# Patient Record
Sex: FEMALE | Race: WHITE | NOT HISPANIC OR LATINO | URBAN - METROPOLITAN AREA
[De-identification: names, ages, dates, MRNs, and addresses within clinical notes are randomized per-mention and may not be internally consistent; named-entity substitution may affect disease eponyms.]

---

## 2017-01-19 ENCOUNTER — OFFICE VISIT (OUTPATIENT)
Dept: RETAIL CLINIC | Facility: CLINIC | Age: 57
End: 2017-01-19

## 2017-01-19 VITALS
SYSTOLIC BLOOD PRESSURE: 120 MMHG | HEART RATE: 90 BPM | RESPIRATION RATE: 18 BRPM | OXYGEN SATURATION: 96 % | DIASTOLIC BLOOD PRESSURE: 88 MMHG | TEMPERATURE: 97.9 F

## 2017-01-19 DIAGNOSIS — J03.90 TONSILLITIS: Primary | ICD-10-CM

## 2017-01-19 DIAGNOSIS — H65.21 RIGHT CHRONIC SEROUS OTITIS MEDIA: ICD-10-CM

## 2017-01-19 PROCEDURE — 99213 OFFICE O/P EST LOW 20 MIN: CPT | Performed by: NURSE PRACTITIONER

## 2017-01-19 RX ORDER — LORATADINE 10 MG/1
10 TABLET ORAL DAILY
Qty: 30 TABLET | Refills: 0 | Status: SHIPPED | OUTPATIENT
Start: 2017-01-19 | End: 2017-02-18

## 2017-01-19 RX ORDER — FLUTICASONE PROPIONATE 50 MCG
1 SPRAY, SUSPENSION (ML) NASAL 2 TIMES DAILY
Qty: 1 EACH | Refills: 0 | Status: SHIPPED | OUTPATIENT
Start: 2017-01-19 | End: 2017-02-18

## 2017-01-19 RX ORDER — PREDNISONE 10 MG/1
10 TABLET ORAL 2 TIMES DAILY
Qty: 14 TABLET | Refills: 0 | Status: SHIPPED | OUTPATIENT
Start: 2017-01-19 | End: 2017-01-26

## 2017-01-19 NOTE — PROGRESS NOTES
Subjective   Paige Castro is a 56 y.o. female.     HPI Comments: Patient reports she has felt ear pressure and popping since mid-November. She was treated for serous otitis 11/29 with prednisone and reports her symptoms improved but never completely resolved. She has not seen ENT or allergist for these complaints.    URI    This is a new problem. The current episode started yesterday. The problem has been unchanged. There has been no fever. Associated symptoms include congestion, a plugged ear sensation and a sore throat. Pertinent negatives include no abdominal pain, chest pain, coughing, diarrhea, ear pain, headaches, nausea, neck pain, rash, rhinorrhea, sinus pain, sneezing, swollen glands, vomiting or wheezing. She has tried nothing for the symptoms.       The following portions of the patient's history were reviewed and updated as appropriate: allergies, current medications, past family history, past medical history, past social history, past surgical history and problem list.    Review of Systems   Constitutional: Positive for fatigue. Negative for appetite change, chills, diaphoresis and fever.   HENT: Positive for congestion, postnasal drip and sore throat. Negative for dental problem, ear discharge, ear pain, facial swelling, hearing loss, mouth sores, nosebleeds, rhinorrhea, sinus pressure, sneezing, tinnitus, trouble swallowing and voice change.         Positive for ear pressure     Eyes: Negative for pain, discharge, redness and itching.   Respiratory: Negative for cough, chest tightness, shortness of breath, wheezing and stridor.    Cardiovascular: Negative for chest pain and palpitations.   Gastrointestinal: Negative for abdominal pain, constipation, diarrhea, nausea and vomiting.   Genitourinary: Negative for decreased urine volume.   Musculoskeletal: Negative for myalgias, neck pain and neck stiffness.   Skin: Negative for rash.   Allergic/Immunologic: Positive for environmental allergies.    Neurological: Negative for dizziness, syncope, weakness and headaches.       Objective   Physical Exam   Constitutional: She is oriented to person, place, and time. She appears well-developed and well-nourished. She is cooperative.  Non-toxic appearance. She does not appear ill. No distress.   HENT:   Right Ear: Hearing, external ear and ear canal normal. Tympanic membrane is not scarred, not perforated, not erythematous, not retracted and not bulging. A middle ear effusion is present.   Left Ear: Hearing, external ear and ear canal normal. Tympanic membrane is not scarred, not perforated, not erythematous, not retracted and not bulging. A middle ear effusion is present.   Nose: Nose normal. Right sinus exhibits no maxillary sinus tenderness and no frontal sinus tenderness. Left sinus exhibits no maxillary sinus tenderness and no frontal sinus tenderness.   Mouth/Throat: Uvula is midline and mucous membranes are normal. Posterior oropharyngeal erythema (mild) present. No oropharyngeal exudate or posterior oropharyngeal edema. Tonsils are 3+ on the right. Tonsils are 3+ on the left. No tonsillar exudate.   Eyes: Conjunctivae and lids are normal.   Cardiovascular: Normal rate, regular rhythm, S1 normal and S2 normal.    Pulmonary/Chest: Effort normal and breath sounds normal.   Abdominal: Soft. Normal appearance and bowel sounds are normal. There is no tenderness.   Lymphadenopathy:     She has no cervical adenopathy.   Neurological: She is alert and oriented to person, place, and time.   Skin: Skin is warm and dry. She is not diaphoretic. No pallor.   Vitals reviewed.      Assessment/Plan   Paige was seen today for fatigue, generalized body aches, diarrhea, ear fullness, swollen glands, headache and chills.    Diagnoses and all orders for this visit:    Tonsillitis    Right chronic serous otitis media    Other orders  -     predniSONE (DELTASONE) 10 MG tablet; Take 1 tablet by mouth 2 (Two) Times a Day for 7  days.  -     fluticasone (FLONASE) 50 MCG/ACT nasal spray; 1 spray into each nostril 2 (Two) Times a Day for 30 days.  -     loratadine (CLARITIN) 10 MG tablet; Take 1 tablet by mouth Daily for 30 days.

## 2017-01-19 NOTE — LETTER
January 19, 2017     Patient: Paige Castro   YOB: 1960   Date of Visit: 1/19/2017       To Whom It May Concern:    Paige Castro was seen at my clinic today. Please excuse her from work today due to her acute illness.           Sincerely,      NORA Hernandes  Provider Peri Gustafson

## 2017-01-19 NOTE — PATIENT INSTRUCTIONS
Tonsillitis  Tonsillitis is an infection of the throat that causes the tonsils to become red, tender, and swollen. Tonsils are collections of lymphoid tissue at the back of the throat. Each tonsil has crevices (crypts). Tonsils help fight nose and throat infections and keep infection from spreading to other parts of the body for the first 18 months of life.   CAUSES  Sudden (acute) tonsillitis is usually caused by infection with streptococcal bacteria. Long-lasting (chronic) tonsillitis occurs when the crypts of the tonsils become filled with pieces of food and bacteria, which makes it easy for the tonsils to become repeatedly infected.  SYMPTOMS   Symptoms of tonsillitis include:  · A sore throat, with possible difficulty swallowing.  · White patches on the tonsils.  · Fever.  · Tiredness.  · New episodes of snoring during sleep, when you did not snore before.  · Small, foul-smelling, yellowish-white pieces of material (tonsilloliths) that you occasionally cough up or spit out. The tonsilloliths can also cause you to have bad breath.  DIAGNOSIS  Tonsillitis can be diagnosed through a physical exam. Diagnosis can be confirmed with the results of lab tests, including a throat culture.  TREATMENT   The goals of tonsillitis treatment include the reduction of the severity and duration of symptoms and prevention of associated conditions. Symptoms of tonsillitis can be improved with the use of steroids to reduce the swelling. Tonsillitis caused by bacteria can be treated with antibiotic medicines. Usually, treatment with antibiotic medicines is started before the cause of the tonsillitis is known. However, if it is determined that the cause is not bacterial, antibiotic medicines will not treat the tonsillitis. If attacks of tonsillitis are severe and frequent, your health care provider may recommend surgery to remove the tonsils (tonsillectomy).  HOME CARE INSTRUCTIONS   · Rest as much as possible and get plenty of  sleep.  · Drink plenty of fluids. While the throat is very sore, eat soft foods or liquids, such as sherbet, soups, or instant breakfast drinks.  · Eat frozen ice pops.  · Gargle with a warm or cold liquid to help soothe the throat. Mix 1/4 teaspoon of salt and 1/4 teaspoon of baking soda in 8 oz of water.  SEEK MEDICAL CARE IF:   · Large, tender lumps develop in your neck.  · A rash develops.  · A green, yellow-brown, or bloody substance is coughed up.  · You are unable to swallow liquids or food for 24 hours.  · You notice that only one of the tonsils is swollen.  SEEK IMMEDIATE MEDICAL CARE IF:   · You develop any new symptoms such as vomiting, severe headache, stiff neck, chest pain, or trouble breathing or swallowing.  · You have severe throat pain along with drooling or voice changes.  · You have severe pain, unrelieved with recommended medications.  · You are unable to fully open the mouth.  · You develop redness, swelling, or severe pain anywhere in the neck.  · You have a fever.  MAKE SURE YOU:   · Understand these instructions.  · Will watch your condition.  · Will get help right away if you are not doing well or get worse.     This information is not intended to replace advice given to you by your health care provider. Make sure you discuss any questions you have with your health care provider.     Document Released: 09/27/2006 Document Revised: 01/08/2016 Document Reviewed: 06/06/2014  [employer] Interactive Patient Education ©2016 [employer]      Serous Otitis Media  Serous otitis media is fluid in the middle ear space. This space contains the bones for hearing and air. Air in the middle ear space helps to transmit sound.   The air gets there through the eustachian tube. This tube goes from the back of the nose (nasopharynx) to the middle ear space. It keeps the pressure in the middle ear the same as the outside world. It also helps to drain fluid from the middle ear space.  CAUSES   Serous otitis  media occurs when the eustachian tube gets blocked. Blockage can come from:  · Ear infections.  · Colds and other upper respiratory infections.  · Allergies.  · Irritants such as cigarette smoke.  · Sudden changes in air pressure (such as descending in an airplane).  · Enlarged adenoids.  · A mass in the nasopharynx.  During colds and upper respiratory infections, the middle ear space can become temporarily filled with fluid. This can happen after an ear infection also. Once the infection clears, the fluid will generally drain out of the ear through the eustachian tube. If it does not, then serous otitis media occurs.  SIGNS AND SYMPTOMS   · Hearing loss.  · A feeling of fullness in the ear, without pain.  · Young children may not show any symptoms but may show slight behavioral changes, such as agitation, ear pulling, or crying.  DIAGNOSIS   Serous otitis media is diagnosed by an ear exam. Tests may be done to check on the movement of the eardrum. Hearing exams may also be done.  TREATMENT   The fluid most often goes away without treatment. If allergy is the cause, allergy treatment may be helpful. Fluid that persists for several months may require minor surgery. A small tube is placed in the eardrum to:  · Drain the fluid.  · Restore the air in the middle ear space.  In certain situations, antibiotic medicines are used to avoid surgery. Surgery may be done to remove enlarged adenoids (if this is the cause).  HOME CARE INSTRUCTIONS   · Keep children away from tobacco smoke.  · Keep all follow-up visits as directed by your health care provider.  SEEK MEDICAL CARE IF:   · Your hearing is not better in 3 months.  · Your hearing is worse.  · You have ear pain.  · You have drainage from the ear.  · You have dizziness.  · You have serous otitis media only in one ear or have any bleeding from your nose (epistaxis).  · You notice a lump on your neck.  MAKE SURE YOU:  · Understand these instructions.    · Will watch your  condition.    · Will get help right away if you are not doing well or get worse.       This information is not intended to replace advice given to you by your health care provider. Make sure you discuss any questions you have with your health care provider.     Document Released: 03/09/2005 Document Revised: 01/08/2016 Document Reviewed: 07/15/2014  [employer] Interactive Interactive Patient Education ©2016 [employer].

## 2017-01-19 NOTE — MR AVS SNAPSHOT
Paige Castro   1/19/2017 2:00 PM   Office Visit    Dept Phone:  [phone]   Encounter #:  [id number]    Provider:  Romie Gustafson   Department:  [facility]                Your Full Care Plan              Today's Medication Changes          These changes are accurate as of: 1/19/17  2:28 PM.  If you have any questions, ask your nurse or doctor.               New Medication(s)Ordered:     fluticasone 50 MCG/ACT nasal spray   Commonly known as:  FLONASE   1 spray into each nostril 2 (Two) Times a Day for 30 days.   Started by:  Romie Gustafson       loratadine 10 MG tablet   Commonly known as:  CLARITIN   Take 1 tablet by mouth Daily for 30 days.   Started by:  Provider Peri Gustafson         Medication(s)that have changed:     predniSONE 10 MG tablet   Commonly known as:  DELTASONE   Take 1 tablet by mouth 2 (Two) Times a Day for 7 days.   What changed:    - how much to take  - how to take this  - when to take this  - additional instructions   Changed by:  Provider Peri Gustafson            Where to Get Your Medications      These medications were sent to [facility] [address] [place] [address], [place] - [address] [address] - [phone] [facility] [phone]   [address] [address]     Phone:  [phone]     fluticasone 50 MCG/ACT nasal spray    loratadine 10 MG tablet    predniSONE 10 MG tablet                  Your Updated Medication List          This list is accurate as of: 1/19/17  2:28 PM.  Always use your most recent med list.                fluticasone 50 MCG/ACT nasal spray   Commonly known as:  FLONASE   1 spray into each nostril 2 (Two) Times a Day for 30 days.       loratadine 10 MG tablet   Commonly known as:  CLARITIN   Take 1 tablet by mouth Daily for 30 days.       losartan 50 MG tablet   Commonly known as:  COZAAR       predniSONE 10 MG tablet   Commonly known as:  DELTASONE   Take 1 tablet by mouth 2 (Two) Times a Day for 7 days.       TYLENOL PO               You Were Diagnosed With        Codes Comments    Tonsillitis    -  Primary ICD-10-CM: J03.90  ICD-9-CM: 463     Right chronic serous otitis media     ICD-10-CM: H65.21  ICD-9-CM: 381.10       Instructions    Tonsillitis  Tonsillitis is an infection of the throat that causes the tonsils to become red, tender, and swollen. Tonsils are collections of lymphoid tissue at the back of the throat. Each tonsil has crevices (crypts). Tonsils help fight nose and throat infections and keep infection from spreading to other parts of the body for the first 18 months of life.   CAUSES  Sudden (acute) tonsillitis is usually caused by infection with streptococcal bacteria. Long-lasting (chronic) tonsillitis occurs when the crypts of the tonsils become filled with pieces of food and bacteria, which makes it easy for the tonsils to become repeatedly infected.  SYMPTOMS   Symptoms of tonsillitis include:  · A sore throat, with possible difficulty swallowing.  · White patches on the tonsils.  · Fever.  · Tiredness.  · New episodes of snoring during sleep, when you did not snore before.  · Small, foul-smelling, yellowish-white pieces of material (tonsilloliths) that you occasionally cough up or spit out. The tonsilloliths can also cause you to have bad breath.  DIAGNOSIS  Tonsillitis can be diagnosed through a physical exam. Diagnosis can be confirmed with the results of lab tests, including a throat culture.  TREATMENT   The goals of tonsillitis treatment include the reduction of the severity and duration of symptoms and prevention of associated conditions. Symptoms of tonsillitis can be improved with the use of steroids to reduce the swelling. Tonsillitis caused by bacteria can be treated with antibiotic medicines. Usually, treatment with antibiotic medicines is started before the cause of the tonsillitis is known. However, if it is determined that the cause is not bacterial, antibiotic medicines will not treat the tonsillitis. If  attacks of tonsillitis are severe and frequent, your health care provider may recommend surgery to remove the tonsils (tonsillectomy).  HOME CARE INSTRUCTIONS   · Rest as much as possible and get plenty of sleep.  · Drink plenty of fluids. While the throat is very sore, eat soft foods or liquids, such as sherbet, soups, or instant breakfast drinks.  · Eat frozen ice pops.  · Gargle with a warm or cold liquid to help soothe the throat. Mix 1/4 teaspoon of salt and 1/4 teaspoon of baking soda in 8 oz of water.  SEEK MEDICAL CARE IF:   · Large, tender lumps develop in your neck.  · A rash develops.  · A green, yellow-brown, or bloody substance is coughed up.  · You are unable to swallow liquids or food for 24 hours.  · You notice that only one of the tonsils is swollen.  SEEK IMMEDIATE MEDICAL CARE IF:   · You develop any new symptoms such as vomiting, severe headache, stiff neck, chest pain, or trouble breathing or swallowing.  · You have severe throat pain along with drooling or voice changes.  · You have severe pain, unrelieved with recommended medications.  · You are unable to fully open the mouth.  · You develop redness, swelling, or severe pain anywhere in the neck.  · You have a fever.  MAKE SURE YOU:   · Understand these instructions.  · Will watch your condition.  · Will get help right away if you are not doing well or get worse.     This information is not intended to replace advice given to you by your health care provider. Make sure you discuss any questions you have with your health care provider.     Document Released: 09/27/2006 Document Revised: 01/08/2016 Document Reviewed: 06/06/2014  [employer] Interactive Patient Education ©2016 [employer]      Serous Otitis Media  Serous otitis media is fluid in the middle ear space. This space contains the bones for hearing and air. Air in the middle ear space helps to transmit sound.   The air gets there through the eustachian tube. This tube goes from the back  of the nose (nasopharynx) to the middle ear space. It keeps the pressure in the middle ear the same as the outside world. It also helps to drain fluid from the middle ear space.  CAUSES   Serous otitis media occurs when the eustachian tube gets blocked. Blockage can come from:  · Ear infections.  · Colds and other upper respiratory infections.  · Allergies.  · Irritants such as cigarette smoke.  · Sudden changes in air pressure (such as descending in an airplane).  · Enlarged adenoids.  · A mass in the nasopharynx.  During colds and upper respiratory infections, the middle ear space can become temporarily filled with fluid. This can happen after an ear infection also. Once the infection clears, the fluid will generally drain out of the ear through the eustachian tube. If it does not, then serous otitis media occurs.  SIGNS AND SYMPTOMS   · Hearing loss.  · A feeling of fullness in the ear, without pain.  · Young children may not show any symptoms but may show slight behavioral changes, such as agitation, ear pulling, or crying.  DIAGNOSIS   Serous otitis media is diagnosed by an ear exam. Tests may be done to check on the movement of the eardrum. Hearing exams may also be done.  TREATMENT   The fluid most often goes away without treatment. If allergy is the cause, allergy treatment may be helpful. Fluid that persists for several months may require minor surgery. A small tube is placed in the eardrum to:  · Drain the fluid.  · Restore the air in the middle ear space.  In certain situations, antibiotic medicines are used to avoid surgery. Surgery may be done to remove enlarged adenoids (if this is the cause).  HOME CARE INSTRUCTIONS   · Keep children away from tobacco smoke.  · Keep all follow-up visits as directed by your health care provider.  SEEK MEDICAL CARE IF:   · Your hearing is not better in 3 months.  · Your hearing is worse.  · You have ear pain.  · You have drainage from the ear.  · You have  dizziness.  · You have serous otitis media only in one ear or have any bleeding from your nose (epistaxis).  · You notice a lump on your neck.  MAKE SURE YOU:  · Understand these instructions.    · Will watch your condition.    · Will get help right away if you are not doing well or get worse.       This information is not intended to replace advice given to you by your health care provider. Make sure you discuss any questions you have with your health care provider.     Document Released: 2005 Document Revised: 2016 Document Reviewed: 07/15/2014  [employer] Interactive Patient Education © Elsevier Inc.       Patient Instructions History      Upcoming Appointments     Visit Type Date Time Department    OFFICE VISIT 2017  2:00 PM [facility]      [employer] Signup     [facility] allows you to send messages to your doctor, view your test results, renew your prescriptions, schedule appointments, and more. To sign up, go to [employer] and click on the Sign Up Now link in the New User? box. Enter your [employer] Activation Code exactly as it appears below along with the last four digits of your Social Security Number and your Date of Birth () to complete the sign-up process. If you do not sign up before the expiration date, you must request a new code.    Mozilla Activation Code: [email or web address]  Expires: 2017  2:28 PM    If you have questions, you can email [email or web address] or call [phone] to talk to our [employer] staff. Remember, [employer] is NOT to be used for urgent needs. For medical emergencies, dial 911.               Other Info from Your Visit           Allergies     No Known Allergies      Reason for Visit     Fatigue     Generalized Body Aches     Diarrhea pt reports diarrhea this AM.     Ear Fullness pt reports fluid in rt ear.     Swollen Glands     Nausea     Headache     Chills           Vital Signs     Blood Pressure Pulse Temperature     120/88 (BP Location: Left arm, Patient Position: Sitting, Cuff Size: Large Adult) 90 97.9 °F (36.6 °C) (Oral)    Respirations Oxygen Saturation Smoking Status    18 96% Passive Smoke Exposure - Never Smoker      Problems and Diagnoses Noted     Tonsillitis    -  Primary    Middle ear infection

## 2017-02-05 ENCOUNTER — OFFICE VISIT (OUTPATIENT)
Dept: RETAIL CLINIC | Facility: CLINIC | Age: 57
End: 2017-02-05

## 2017-02-05 VITALS
HEART RATE: 73 BPM | TEMPERATURE: 97.7 F | DIASTOLIC BLOOD PRESSURE: 90 MMHG | RESPIRATION RATE: 20 BRPM | OXYGEN SATURATION: 98 % | SYSTOLIC BLOOD PRESSURE: 144 MMHG

## 2017-02-05 DIAGNOSIS — J01.00 ACUTE MAXILLARY SINUSITIS, RECURRENCE NOT SPECIFIED: Primary | ICD-10-CM

## 2017-02-05 LAB
EXPIRATION DATE: NORMAL
INTERNAL CONTROL: NORMAL
Lab: NORMAL
S PYO AG THROAT QL: NEGATIVE

## 2017-02-05 PROCEDURE — 99213 OFFICE O/P EST LOW 20 MIN: CPT | Performed by: NURSE PRACTITIONER

## 2017-02-05 PROCEDURE — 87880 STREP A ASSAY W/OPTIC: CPT | Performed by: NURSE PRACTITIONER

## 2017-02-05 RX ORDER — AMOXICILLIN AND CLAVULANATE POTASSIUM 875; 125 MG/1; MG/1
1 TABLET, FILM COATED ORAL 2 TIMES DAILY
Qty: 20 TABLET | Refills: 0 | Status: SHIPPED | OUTPATIENT
Start: 2017-02-05 | End: 2017-02-15

## 2017-02-05 NOTE — PATIENT INSTRUCTIONS
Call ENT tomorrow to check on your throat culture results.      Sinusitis, Adult  Sinusitis is redness, soreness, and inflammation of the paranasal sinuses. Paranasal sinuses are air pockets within the bones of your face. They are located beneath your eyes, in the middle of your forehead, and above your eyes. In healthy paranasal sinuses, mucus is able to drain out, and air is able to circulate through them by way of your nose. However, when your paranasal sinuses are inflamed, mucus and air can become trapped. This can allow bacteria and other germs to grow and cause infection.  Sinusitis can develop quickly and last only a short time (acute) or continue over a long period (chronic). Sinusitis that lasts for more than 12 weeks is considered chronic.  CAUSES  Causes of sinusitis include:  · Allergies.  · Structural abnormalities, such as displacement of the cartilage that separates your nostrils (deviated septum), which can decrease the air flow through your nose and sinuses and affect sinus drainage.  · Functional abnormalities, such as when the small hairs (cilia) that line your sinuses and help remove mucus do not work properly or are not present.  SIGNS AND SYMPTOMS  Symptoms of acute and chronic sinusitis are the same. The primary symptoms are pain and pressure around the affected sinuses. Other symptoms include:  · Upper toothache.  · Earache.  · Headache.  · Bad breath.  · Decreased sense of smell and taste.  · A cough, which worsens when you are lying flat.  · Fatigue.  · Fever.  · Thick drainage from your nose, which often is green and may contain pus (purulent).  · Swelling and warmth over the affected sinuses.  DIAGNOSIS  Your health care provider will perform a physical exam. During your exam, your health care provider may perform any of the following to help determine if you have acute sinusitis or chronic sinusitis:  · Look in your nose for signs of abnormal growths in your nostrils (nasal  polyps).  · Tap over the affected sinus to check for signs of infection.  · View the inside of your sinuses using an imaging device that has a light attached (endoscope).  If your health care provider suspects that you have chronic sinusitis, one or more of the following tests may be recommended:  · Allergy tests.  · Nasal culture. A sample of mucus is taken from your nose, sent to a lab, and screened for bacteria.  · Nasal cytology. A sample of mucus is taken from your nose and examined by your health care provider to determine if your sinusitis is related to an allergy.  TREATMENT  Most cases of acute sinusitis are related to a viral infection and will resolve on their own within 10 days. Sometimes, medicines are prescribed to help relieve symptoms of both acute and chronic sinusitis. These may include pain medicines, decongestants, nasal steroid sprays, or saline sprays.  However, for sinusitis related to a bacterial infection, your health care provider will prescribe antibiotic medicines. These are medicines that will help kill the bacteria causing the infection.  Rarely, sinusitis is caused by a fungal infection. In these cases, your health care provider will prescribe antifungal medicine.  For some cases of chronic sinusitis, surgery is needed. Generally, these are cases in which sinusitis recurs more than 3 times per year, despite other treatments.  HOME CARE INSTRUCTIONS  · Drink plenty of water. Water helps thin the mucus so your sinuses can drain more easily.  · Use a humidifier.  · Inhale steam 3-4 times a day (for example, sit in the bathroom with the shower running).  · Apply a warm, moist washcloth to your face 3-4 times a day, or as directed by your health care provider.  · Use saline nasal sprays to help moisten and clean your sinuses.  · Take medicines only as directed by your health care provider.  · If you were prescribed either an antibiotic or antifungal medicine, finish it all even if you start  to feel better.  SEEK IMMEDIATE MEDICAL CARE IF:  · You have increasing pain or severe headaches.  · You have nausea, vomiting, or drowsiness.  · You have swelling around your face.  · You have vision problems.  · You have a stiff neck.  · You have difficulty breathing.     This information is not intended to replace advice given to you by your health care provider. Make sure you discuss any questions you have with your health care provider.     Document Released: 12/18/2006 Document Revised: 01/08/2016 Document Reviewed: 01/01/2013  [employer] Interactive Patient Education ©2016 [employer]

## 2017-02-05 NOTE — PROGRESS NOTES
Subjective   Paige Castro is a 56 y.o. female.     HPI Comments: Patient was seen on 1/19/17 and diagnosed with tonsillitis. She took prednisone and started flonase and claritin at that time. Her symptoms improved slightly, but she had an appointment with ENT 1.5 weeks ago and was told that she needed to have a tonsillectomy in order for her symptoms to improve. A culture was done to determine if she was a strep carrier and she has not received those results back from the office. 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 She reports the tumor spontaneously resolved without treatment. She is complaining of severe congestion as well that worsened 2 days ago.    Sinusitis   This is a new problem. Episode onset: 2 days ago. The problem has been gradually worsening since onset. There has been no fever. Associated symptoms include congestion, coughing, ear pain (right sided only) and sinus pressure. Pertinent negatives include no chills, diaphoresis, headaches, neck pain, shortness of breath, sneezing or sore throat. Past treatments include nothing.       The following portions of the patient's history were reviewed and updated as appropriate: allergies, current medications, past family history, past medical history, past social history, past surgical history and problem list.    Review of Systems   Constitutional: Positive for fatigue. Negative for appetite change, chills, diaphoresis and fever.   HENT: Positive for congestion, dental problem (upper and lower teeth pain), ear pain (right sided only), postnasal drip, rhinorrhea and sinus pressure. Negative for drooling, ear discharge, facial swelling, hearing loss, mouth sores, nosebleeds, sneezing, sore throat, tinnitus, trouble swallowing and voice change.    Eyes: Negative for pain, discharge, redness and itching.   Respiratory: Positive for cough. Negative for chest  tightness, shortness of breath, wheezing and stridor.    Cardiovascular: Negative for chest pain and palpitations.   Gastrointestinal: Negative for abdominal pain, constipation, diarrhea, nausea and vomiting.   Genitourinary: Negative for decreased urine volume.   Musculoskeletal: Negative for myalgias, neck pain and neck stiffness.   Skin: Negative for rash.   Allergic/Immunologic: Positive for environmental allergies.   Neurological: Negative for dizziness, syncope, weakness and headaches.       Objective   Physical Exam   Constitutional: She is oriented to person, place, and time. She appears well-developed and well-nourished. She is cooperative.  Non-toxic appearance. She appears ill. No distress.   HENT:   Right Ear: Hearing, tympanic membrane, external ear and ear canal normal.   Left Ear: Hearing, tympanic membrane, external ear and ear canal normal.   Nose: Mucosal edema, rhinorrhea and sinus tenderness present. Right sinus exhibits maxillary sinus tenderness. Right sinus exhibits no frontal sinus tenderness. Left sinus exhibits maxillary sinus tenderness. Left sinus exhibits no frontal sinus tenderness.   Mouth/Throat: Uvula is midline and mucous membranes are normal. Posterior oropharyngeal edema and posterior oropharyngeal erythema (mild) present. No oropharyngeal exudate. Tonsils are 3+ on the right. Tonsils are 3+ on the left. No tonsillar exudate.   Eyes: Conjunctivae and lids are normal.   Cardiovascular: Normal rate, regular rhythm, S1 normal and S2 normal.    Pulmonary/Chest: Effort normal and breath sounds normal.   Abdominal: Soft. Normal appearance and bowel sounds are normal. There is no tenderness.   Lymphadenopathy:     She has no cervical adenopathy.   Neurological: She is alert and oriented to person, place, and time.   Skin: Skin is warm and dry. She is not diaphoretic. No pallor.   Vitals reviewed.      Assessment/Plan   Paige was seen today for sinusitis.    Diagnoses and all orders for  this visit:    Acute maxillary sinusitis, recurrence not specified  -     POC Rapid Strep A    Other orders  -     amoxicillin-clavulanate (AUGMENTIN) 875-125 MG per tablet; Take 1 tablet by mouth 2 (Two) Times a Day for 10 days.  -     Chlorpheniramine-DM 4-30 MG tablet; Take 1 tablet by mouth 4 (Four) Times a Day As Needed (cough/congestion) for up to 5 days.          -      Call ENT for culture results tomorrow        -      Follow up with ENT for persistent or worsening symptoms    Lab Results (most recent)     Procedure Component Value Units Date/Time    POC Rapid Strep A [[id number]] Collected:  02/05/17 1157    Specimen:  Other Updated:  02/05/17 1158     Rapid Strep A Screen Negative      Internal Control Passed      Lot Number [id number]      Expiration Date 6/2018

## 2017-09-13 ENCOUNTER — OFFICE VISIT (OUTPATIENT)
Dept: RETAIL CLINIC | Facility: CLINIC | Age: 57
End: 2017-09-13

## 2017-09-13 VITALS
TEMPERATURE: 97 F | HEART RATE: 78 BPM | DIASTOLIC BLOOD PRESSURE: 72 MMHG | SYSTOLIC BLOOD PRESSURE: 128 MMHG | OXYGEN SATURATION: 98 % | RESPIRATION RATE: 17 BRPM

## 2017-09-13 DIAGNOSIS — H66.004 RECURRENT ACUTE SUPPURATIVE OTITIS MEDIA OF RIGHT EAR WITHOUT SPONTANEOUS RUPTURE OF TYMPANIC MEMBRANE: Primary | ICD-10-CM

## 2017-09-13 DIAGNOSIS — J30.9 ALLERGIC RHINITIS, UNSPECIFIED ALLERGIC RHINITIS TRIGGER, UNSPECIFIED RHINITIS SEASONALITY: ICD-10-CM

## 2017-09-13 PROCEDURE — 99213 OFFICE O/P EST LOW 20 MIN: CPT | Performed by: NURSE PRACTITIONER

## 2017-09-13 RX ORDER — AMOXICILLIN 875 MG/1
875 TABLET, COATED ORAL 2 TIMES DAILY
Qty: 20 TABLET | Refills: 0 | Status: SHIPPED | OUTPATIENT
Start: 2017-09-13 | End: 2017-09-23

## 2017-09-13 NOTE — PROGRESS NOTES
Milka Castro is a 57 y.o. female.     Earache    There is pain in both (right>left) ears. This is a new problem. The current episode started in the past 7 days. The problem occurs constantly. There has been no fever. Associated symptoms include headaches, rhinorrhea and a sore throat. Pertinent negatives include no abdominal pain, coughing or ear discharge. She has tried nothing for the symptoms. Her past medical history is significant for a chronic ear infection. There is no history of hearing loss.        The following portions of the patient's history were reviewed and updated as appropriate: allergies, current medications, past family history, past medical history, past social history, past surgical history and problem list.    Review of Systems   Constitutional: Negative for chills and fever.   HENT: Positive for ear pain, rhinorrhea and sore throat. Negative for ear discharge.    Respiratory: Negative for cough, shortness of breath and wheezing.    Cardiovascular: Negative for chest pain.   Gastrointestinal: Negative for abdominal pain.   Neurological: Positive for dizziness and headaches.       Objective   Physical Exam   Constitutional: She is oriented to person, place, and time. She appears well-developed and well-nourished. No distress.   HENT:   Head: Normocephalic and atraumatic.   Right Ear: Hearing, external ear and ear canal normal. Tympanic membrane is erythematous and bulging. A middle ear effusion is present.   Left Ear: Hearing, external ear and ear canal normal. A middle ear effusion is present.   Nose: Nose normal.   Mouth/Throat: Mucous membranes are normal. Posterior oropharyngeal erythema and tonsillar abscesses present. No oropharyngeal exudate. Tonsils are 2+ on the right. Tonsils are 2+ on the left. No tonsillar exudate.   Eyes: Conjunctivae and EOM are normal. Pupils are equal, round, and reactive to light.   Neck: Normal range of motion. Neck supple.   Cardiovascular: Normal  rate, regular rhythm and normal heart sounds.    Pulmonary/Chest: Effort normal and breath sounds normal.   Neurological: She is alert and oriented to person, place, and time.   Skin: Skin is warm and dry.   Psychiatric: She has a normal mood and affect. Her behavior is normal.       Assessment/Plan   Diagnoses and all orders for this visit:    Recurrent acute suppurative otitis media of right ear without spontaneous rupture of tympanic membrane    Allergic rhinitis, unspecified allergic rhinitis trigger, unspecified rhinitis seasonality    Other orders  -     amoxicillin (AMOXIL) 875 MG tablet; Take 1 tablet by mouth 2 (Two) Times a Day for 10 days.    Take an OTC antihistamine such as Claritin or Zyrtec as per bottle instructions.  Use OTC FLonase as directed.

## 2017-09-13 NOTE — PATIENT INSTRUCTIONS

## 2018-01-04 ENCOUNTER — OFFICE VISIT (OUTPATIENT)
Dept: RETAIL CLINIC | Facility: CLINIC | Age: 58
End: 2018-01-04

## 2018-01-04 VITALS
RESPIRATION RATE: 14 BRPM | SYSTOLIC BLOOD PRESSURE: 110 MMHG | DIASTOLIC BLOOD PRESSURE: 80 MMHG | OXYGEN SATURATION: 98 % | TEMPERATURE: 99.4 F | HEART RATE: 83 BPM

## 2018-01-04 DIAGNOSIS — M25.50 ARTHRALGIA, UNSPECIFIED JOINT: ICD-10-CM

## 2018-01-04 DIAGNOSIS — H65.03 BILATERAL ACUTE SEROUS OTITIS MEDIA, RECURRENCE NOT SPECIFIED: ICD-10-CM

## 2018-01-04 DIAGNOSIS — J10.1 INFLUENZA B: Primary | ICD-10-CM

## 2018-01-04 LAB
EXPIRATION DATE: ABNORMAL
FLUAV AG NPH QL: ABNORMAL
FLUBV AG NPH QL: ABNORMAL
INTERNAL CONTROL: ABNORMAL
Lab: ABNORMAL

## 2018-01-04 PROCEDURE — 99213 OFFICE O/P EST LOW 20 MIN: CPT | Performed by: NURSE PRACTITIONER

## 2018-01-04 PROCEDURE — 87804 INFLUENZA ASSAY W/OPTIC: CPT | Performed by: NURSE PRACTITIONER

## 2018-01-04 RX ORDER — FLUTICASONE PROPIONATE 50 MCG
1 SPRAY, SUSPENSION (ML) NASAL 2 TIMES DAILY
Qty: 1 BOTTLE | Refills: 0 | Status: SHIPPED | OUTPATIENT
Start: 2018-01-04 | End: 2018-01-18

## 2018-01-04 RX ORDER — OSELTAMIVIR PHOSPHATE 75 MG/1
75 CAPSULE ORAL 2 TIMES DAILY
Qty: 10 CAPSULE | Refills: 0 | Status: SHIPPED | OUTPATIENT
Start: 2018-01-04 | End: 2018-01-09

## 2018-01-04 NOTE — PROGRESS NOTES
Subjective     Paige Castro is a 57 y.o.. female.     Cough   This is a new problem. The current episode started in the past 7 days. The problem has been unchanged. The cough is non-productive. Associated symptoms include chills, headaches and postnasal drip. Pertinent negatives include no ear pain, rhinorrhea or sore throat. Treatments tried: acetaminophen. The treatment provided no relief. There is no history of asthma, bronchitis, COPD, emphysema or environmental allergies.       The following portions of the patient's history were reviewed and updated as appropriate: allergies, current medications, past family history, past medical history, past social history, past surgical history and problem list.    Review of Systems   Constitutional: Positive for chills.   HENT: Positive for postnasal drip. Negative for congestion, ear pain, rhinorrhea and sore throat.    Eyes: Negative.    Respiratory: Positive for cough (non-productive).    Gastrointestinal: Positive for diarrhea and nausea. Negative for abdominal pain and vomiting.   Genitourinary: Negative.    Allergic/Immunologic: Negative for environmental allergies.   Neurological: Positive for headaches.       Objective     Vitals:    01/04/18 1151   BP: 110/80   Pulse: 83   Resp: 14   Temp: 99.4 °F (37.4 °C)   TempSrc: Oral   SpO2: 98%       Physical Exam   Constitutional: She is oriented to person, place, and time. She appears well-developed and well-nourished.   HENT:   Head: Normocephalic and atraumatic.   Right Ear: Tympanic membrane is not erythematous.   Left Ear: Tympanic membrane is not erythematous.   Nose: Mucosal edema present. Right sinus exhibits no maxillary sinus tenderness and no frontal sinus tenderness. Left sinus exhibits no maxillary sinus tenderness and no frontal sinus tenderness.   Mouth/Throat: Oropharynx is clear and moist.   Amilcar. TM's with clear fluid noted  PND   Eyes: Conjunctivae are normal. Pupils are equal, round, and reactive to light.  "  Cardiovascular: Normal rate and regular rhythm.    No murmur heard.  Pulmonary/Chest: Effort normal. She has no wheezes. She has no rhonchi. She has no rales.   Musculoskeletal: Normal range of motion.   Lymphadenopathy:     She has no cervical adenopathy.   Neurological: She is alert and oriented to person, place, and time.   Skin: Skin is warm and dry.   Vitals reviewed.      Lab Results (last 24 hours)     Procedure Component Value Units Date/Time    POC Influenza A / B [[id number]]  (Abnormal) Collected:  01/04/18 1223    Specimen:  Swab Updated:  01/04/18 1224     Rapid Influenza A Ag neg     Rapid Influenza B Ag pos     Internal Control Passed     Lot Number [id number]     Expiration Date 2019-6          Assessment/Plan   Paige was seen today for generalized body aches and cough.    Diagnoses and all orders for this visit:    Influenza B  -     oseltamivir (TAMIFLU) 75 MG capsule; Take 1 capsule by mouth 2 (Two) Times a Day for 5 days.    Arthralgia, unspecified joint  -     POC Influenza A / B    Bilateral acute serous otitis media, recurrence not specified  -     fluticasone (FLONASE) 50 MCG/ACT nasal spray; 1 spray into each nostril 2 (Two) Times a Day for 14 days.        Patient Instructions   Influenza, Adult  Influenza, more commonly known as \"the flu,\" is a viral infection that primarily affects the respiratory tract. The respiratory tract includes organs that help you breathe, such as the lungs, nose, and throat. The flu causes many common cold symptoms, as well as a high fever and body aches.  The flu spreads easily from person to person (is contagious). Getting a flu shot (influenza vaccination) every year is the best way to prevent influenza.  CAUSES  Influenza is caused by a virus. You can catch the virus by:  · Breathing in droplets from an infected person's cough or sneeze.  · Touching something that was recently contaminated with the virus and then touching your mouth, nose, or eyes.  RISK " FACTORS  The following factors may make you more likely to get the flu:  · Not cleaning your hands frequently with soap and water or alcohol-based hand .  · Having close contact with many people during cold and flu season.  · Touching your mouth, eyes, or nose without washing or sanitizing your hands first.  · Not drinking enough fluids or not eating a healthy diet.  · Not getting enough sleep or exercise.  · Being under a high amount of stress.  · Not getting a yearly (annual) flu shot.  You may be at a higher risk of complications from the flu, such as a severe lung infection (pneumonia), if you:  · Are over the age of 65.  · Are pregnant.  · Have a weakened disease-fighting system (immune system). You may have a weakened immune system if you:    Have HIV or AIDS.    Are undergoing chemotherapy.    Are taking medicines that reduce the activity of (suppress) the immune system.  · Have a long-term (chronic) illness, such as heart disease, kidney disease, diabetes, or lung disease.  · Have a liver disorder.  · Are obese.  · Have anemia.  SYMPTOMS  Symptoms of this condition typically last 4-10 days and may include:  · Fever.  · Chills.  · Headache, body aches, or muscle aches.  · Sore throat.  · Cough.  · Runny or congested nose.  · Chest discomfort and cough.  · Poor appetite.  · Weakness or tiredness (fatigue).  · Dizziness.  · Nausea or vomiting.  DIAGNOSIS  This condition may be diagnosed based on your medical history and a physical exam. Your health care provider may do a nose or throat swab test to confirm the diagnosis.  TREATMENT  If influenza is detected early, you can be treated with antiviral medicine that can reduce the length of your illness and the severity of your symptoms. This medicine may be given by mouth (orally) or through an IV tube that is inserted in one of your veins.  The goal of treatment is to relieve symptoms by taking care of yourself at home. This may include taking  over-the-counter medicines, drinking plenty of fluids, and adding humidity to the air in your home.  In some cases, influenza goes away on its own. Severe influenza or complications from influenza may be treated in a hospital.  HOME CARE INSTRUCTIONS  · Take over-the-counter and prescription medicines only as told by your health care provider.  · Use a cool mist humidifier to add humidity to the air in your home. This can make breathing easier.  · Rest as needed.  · Drink enough fluid to keep your urine clear or pale yellow.  · Cover your mouth and nose when you cough or sneeze.  · Wash your hands with soap and water often, especially after you cough or sneeze. If soap and water are not available, use hand .  · Stay home from work or school as told by your health care provider. Unless you are visiting your health care provider, try to avoid leaving home until your fever has been gone for 24 hours without the use of medicine.  · Keep all follow-up visits as told by your health care provider. This is important.  PREVENTION  · Getting an annual flu shot is the best way to avoid getting the flu. You may get the flu shot in late summer, fall, or winter. Ask your health care provider when you should get your flu shot.  · Wash your hands often or use hand  often.  · Avoid contact with people who are sick during cold and flu season.  · Eat a healthy diet, drink plenty of fluids, get enough sleep, and exercise regularly.  SEEK MEDICAL CARE IF:  · You develop new symptoms.  · You have:    Chest pain.    Diarrhea.    A fever.  · Your cough gets worse.  · You produce more mucus.  · You feel nauseous or you vomit.  SEEK IMMEDIATE MEDICAL CARE IF:  · You develop shortness of breath or difficulty breathing.  · Your skin or nails turn a bluish color.  · You have severe pain or stiffness in your neck.  · You develop a sudden headache or sudden pain in your face or ear.  · You cannot stop vomiting.     This  information is not intended to replace advice given to you by your health care provider. Make sure you discuss any questions you have with your health care provider.     Document Released: 12/15/2001 Document Revised: 04/10/2017 Document Reviewed: 10/11/2016  [employer] Interactive Patient Education ©2017 [employer]        Return if symptoms worsen or fail to improve with urgent care/ER.

## 2018-01-04 NOTE — PATIENT INSTRUCTIONS
"Influenza, Adult  Influenza, more commonly known as \"the flu,\" is a viral infection that primarily affects the respiratory tract. The respiratory tract includes organs that help you breathe, such as the lungs, nose, and throat. The flu causes many common cold symptoms, as well as a high fever and body aches.  The flu spreads easily from person to person (is contagious). Getting a flu shot (influenza vaccination) every year is the best way to prevent influenza.  CAUSES  Influenza is caused by a virus. You can catch the virus by:  · Breathing in droplets from an infected person's cough or sneeze.  · Touching something that was recently contaminated with the virus and then touching your mouth, nose, or eyes.  RISK FACTORS  The following factors may make you more likely to get the flu:  · Not cleaning your hands frequently with soap and water or alcohol-based hand .  · Having close contact with many people during cold and flu season.  · Touching your mouth, eyes, or nose without washing or sanitizing your hands first.  · Not drinking enough fluids or not eating a healthy diet.  · Not getting enough sleep or exercise.  · Being under a high amount of stress.  · Not getting a yearly (annual) flu shot.  You may be at a higher risk of complications from the flu, such as a severe lung infection (pneumonia), if you:  · Are over the age of 65.  · Are pregnant.  · Have a weakened disease-fighting system (immune system). You may have a weakened immune system if you:    Have HIV or AIDS.    Are undergoing chemotherapy.    Are taking medicines that reduce the activity of (suppress) the immune system.  · Have a long-term (chronic) illness, such as heart disease, kidney disease, diabetes, or lung disease.  · Have a liver disorder.  · Are obese.  · Have anemia.  SYMPTOMS  Symptoms of this condition typically last 4-10 days and may include:  · Fever.  · Chills.  · Headache, body aches, or muscle aches.  · Sore " throat.  · Cough.  · Runny or congested nose.  · Chest discomfort and cough.  · Poor appetite.  · Weakness or tiredness (fatigue).  · Dizziness.  · Nausea or vomiting.  DIAGNOSIS  This condition may be diagnosed based on your medical history and a physical exam. Your health care provider may do a nose or throat swab test to confirm the diagnosis.  TREATMENT  If influenza is detected early, you can be treated with antiviral medicine that can reduce the length of your illness and the severity of your symptoms. This medicine may be given by mouth (orally) or through an IV tube that is inserted in one of your veins.  The goal of treatment is to relieve symptoms by taking care of yourself at home. This may include taking over-the-counter medicines, drinking plenty of fluids, and adding humidity to the air in your home.  In some cases, influenza goes away on its own. Severe influenza or complications from influenza may be treated in a hospital.  HOME CARE INSTRUCTIONS  · Take over-the-counter and prescription medicines only as told by your health care provider.  · Use a cool mist humidifier to add humidity to the air in your home. This can make breathing easier.  · Rest as needed.  · Drink enough fluid to keep your urine clear or pale yellow.  · Cover your mouth and nose when you cough or sneeze.  · Wash your hands with soap and water often, especially after you cough or sneeze. If soap and water are not available, use hand .  · Stay home from work or school as told by your health care provider. Unless you are visiting your health care provider, try to avoid leaving home until your fever has been gone for 24 hours without the use of medicine.  · Keep all follow-up visits as told by your health care provider. This is important.  PREVENTION  · Getting an annual flu shot is the best way to avoid getting the flu. You may get the flu shot in late summer, fall, or winter. Ask your health care provider when you should  get your flu shot.  · Wash your hands often or use hand  often.  · Avoid contact with people who are sick during cold and flu season.  · Eat a healthy diet, drink plenty of fluids, get enough sleep, and exercise regularly.  SEEK MEDICAL CARE IF:  · You develop new symptoms.  · You have:    Chest pain.    Diarrhea.    A fever.  · Your cough gets worse.  · You produce more mucus.  · You feel nauseous or you vomit.  SEEK IMMEDIATE MEDICAL CARE IF:  · You develop shortness of breath or difficulty breathing.  · Your skin or nails turn a bluish color.  · You have severe pain or stiffness in your neck.  · You develop a sudden headache or sudden pain in your face or ear.  · You cannot stop vomiting.     This information is not intended to replace advice given to you by your health care provider. Make sure you discuss any questions you have with your health care provider.     Document Released: 12/15/2001 Document Revised: 04/10/2017 Document Reviewed: 10/11/2016  [employer] Patient Education ©2017 [employer]

## 2018-09-19 ENCOUNTER — OFFICE VISIT (OUTPATIENT)
Dept: RETAIL CLINIC | Facility: CLINIC | Age: 58
End: 2018-09-19

## 2018-09-19 VITALS
OXYGEN SATURATION: 97 % | HEART RATE: 86 BPM | DIASTOLIC BLOOD PRESSURE: 90 MMHG | TEMPERATURE: 98.5 F | SYSTOLIC BLOOD PRESSURE: 134 MMHG

## 2018-09-19 DIAGNOSIS — N30.01 ACUTE CYSTITIS WITH HEMATURIA: Primary | ICD-10-CM

## 2018-09-19 LAB
BILIRUB BLD-MCNC: NEGATIVE MG/DL
CLARITY, POC: ABNORMAL
COLOR UR: ABNORMAL
GLUCOSE UR STRIP-MCNC: NEGATIVE MG/DL
KETONES UR QL: NEGATIVE
LEUKOCYTE EST, POC: NEGATIVE
NITRITE UR-MCNC: NEGATIVE MG/ML
PH UR: 5.5 [PH] (ref 5–8)
PROT UR STRIP-MCNC: ABNORMAL MG/DL
RBC # UR STRIP: ABNORMAL /UL
SP GR UR: 1.02 (ref 1–1.03)
UROBILINOGEN UR QL: NORMAL

## 2018-09-19 PROCEDURE — 81003 URINALYSIS AUTO W/O SCOPE: CPT | Performed by: NURSE PRACTITIONER

## 2018-09-19 PROCEDURE — 99213 OFFICE O/P EST LOW 20 MIN: CPT | Performed by: NURSE PRACTITIONER

## 2018-09-19 RX ORDER — PHENAZOPYRIDINE HYDROCHLORIDE 200 MG/1
200 TABLET, FILM COATED ORAL 3 TIMES DAILY PRN
Qty: 6 TABLET | Refills: 0 | Status: SHIPPED | OUTPATIENT
Start: 2018-09-19 | End: 2018-09-21

## 2018-09-19 RX ORDER — SULFAMETHOXAZOLE AND TRIMETHOPRIM 400; 80 MG/1; MG/1
1 TABLET ORAL 2 TIMES DAILY
Qty: 10 TABLET | Refills: 0 | Status: SHIPPED | OUTPATIENT
Start: 2018-09-19 | End: 2018-09-24

## 2018-09-19 NOTE — PROGRESS NOTES
"Milka Castro is a 58 y.o. female.     Patient reports \"low kidney function\" that has been found for several years. Her most recent blood work was 1 month ago which again revealed low kidney function but pt doesn't know exact lab results. There are no labs available in pt's chart for review today.       Urinary Tract Infection    This is a new problem. The current episode started 1 to 4 weeks ago. The problem occurs every urination. The problem has been unchanged. The quality of the pain is described as aching and burning. The pain is moderate. There has been no fever. She is sexually active. There is no history of pyelonephritis. Associated symptoms include flank pain (mid-lower back pain bilaterally that feels different than her baseline back pain), frequency, hematuria, hesitancy and nausea. Pertinent negatives include no chills, discharge, urgency or vomiting. She has tried increased fluids for the symptoms. The treatment provided mild relief. There is no history of catheterization, kidney stones, recurrent UTIs or a urological procedure.       The following portions of the patient's history were reviewed and updated as appropriate: allergies, current medications, past medical history, past social history, past surgical history and problem list.    Review of Systems   Constitutional: Negative for appetite change, chills, diaphoresis, fatigue and fever.   Gastrointestinal: Positive for abdominal pain (lower abdomen, cramping/pressure) and nausea. Negative for constipation, diarrhea and vomiting.   Genitourinary: Positive for dysuria, flank pain (mid-lower back pain bilaterally that feels different than her baseline back pain), frequency, hematuria and hesitancy. Negative for decreased urine volume, difficulty urinating, genital sores, pelvic pain, urgency, vaginal bleeding, vaginal discharge and vaginal pain.   Musculoskeletal: Negative for myalgias.   Skin: Negative for color change.   Neurological: " Negative for dizziness and headaches.       Objective   Physical Exam   Constitutional: She is oriented to person, place, and time. She appears well-developed and well-nourished. She is cooperative.  Non-toxic appearance. She does not appear ill. No distress.   Cardiovascular: Normal rate, regular rhythm, S1 normal and S2 normal.    Pulmonary/Chest: Effort normal and breath sounds normal.   Abdominal: Soft. Bowel sounds are normal. There is tenderness in the suprapubic area. There is no rigidity, no rebound, no guarding and no CVA tenderness.   Neurological: She is alert and oriented to person, place, and time.   Skin: Skin is warm and dry. She is not diaphoretic. No pallor.   Vitals reviewed.      Assessment/Plan   Paige was seen today for urinary tract infection.    Diagnoses and all orders for this visit:    Acute cystitis with hematuria  -     Urine Culture - Urine, Urine, Clean Catch  -     POC Urinalysis Dipstick, Automated  -     sulfamethoxazole-trimethoprim (BACTRIM) 400-80 MG tablet; Take 1 tablet by mouth 2 (Two) Times a Day for 5 days.  -     phenazopyridine (PYRIDIUM) 200 MG tablet; Take 1 tablet by mouth 3 (Three) Times a Day As Needed for bladder spasms for up to 2 days.          -     Will call with urine culture results once available        -     Increase H2O intake        -     Discussed possibility of kidney stones-s/s warranting ER evaluation        -     Follow up with PCP for persistent symptoms or UC/ER for new/worsening sx    Lab Results (last 24 hours)     Procedure Component Value Units Date/Time    POC Urinalysis Dipstick, Automated [[id number]]  (Abnormal) Collected:  09/19/18 1318    Specimen:  Urine Updated:  09/19/18 1319     Color Dark Yellow     Clarity, UA Cloudy (A)     Specific Gravity  1.020     pH, Urine 5.5     Leukocytes Negative     Nitrite, UA Negative     Protein, POC Trace (A) mg/dL      Glucose, UA Negative mg/dL      Ketones, UA Negative     Urobilinogen, UA Normal      Bilirubin Negative     Blood, UA Large (A)

## 2018-09-21 ENCOUNTER — TELEPHONE (OUTPATIENT)
Dept: RETAIL CLINIC | Facility: CLINIC | Age: 58
End: 2018-09-21

## 2018-09-21 LAB
BACTERIA UR CULT: NORMAL
BACTERIA UR CULT: NORMAL

## 2018-09-21 NOTE — TELEPHONE ENCOUNTER
Called and spoke with pt; informed of her urine culture results, less than 10,000 colonies; discussed pt still having complaints/issues; informed she could stop bactrim and needed to follow up with her primary care provider or a GYN. Pt verb. Understanding.